# Patient Record
(demographics unavailable — no encounter records)

---

## 2024-10-21 NOTE — PHYSICAL EXAM
[Obese] : obese [Normal Conjunctiva] : normal conjunctiva [Normal Venous Pressure] : normal venous pressure [5th Left ICS - MCL] : palpated at the 5th LICS in the midclavicular line [Normal Rate] : normal [Rhythm Regular] : regular [Normal S1] : normal S1 [Normal S2] : normal S2 [II] : a grade 2 [___ +] : bilateral [unfilled]U+ pitting edema to the ankles [Left Carotid Bruit] : left carotid bruit heard [2+] : left 2+ [1+] : left 1+ [Bruit] : a bruit was heard [Normal] : clear lung fields, good air entry, no respiratory distress [Soft] : abdomen soft [Non Tender] : non-tender [Normal Bowel Sounds] : normal bowel sounds [Normal Gait] : normal gait [No Edema] : no edema [No Cyanosis] : no cyanosis [Edema ___] : edema [unfilled] [Memory Deficit] : memory deficit [Alert and Oriented] : alert and oriented [Normal memory] : normal memory [Rt] : no varicose veins of the right leg [Lt] : no varicose veins of the left leg [de-identified] : ulcer right leg

## 2024-10-21 NOTE — DISCUSSION/SUMMARY
[EKG obtained to assist in diagnosis and management of assessed problem(s)] : EKG obtained to assist in diagnosis and management of assessed problem(s) [FreeTextEntry1] : 51 year old  male with morbid obesity , who developed acute on chronic renal failure , , Patient denies any chest pain  with diastolic heart failure ? with hyperechoic myocardium , patient had PYP scan without evidence of amyloidosis , and biopsy of fat did not reveal amyloidosis , clinically compensated   Elevated blood pressure :   patient recieving Epogen ,  advised the patient to follow low salt diet , metoprolol 25 mg po daily consider increasing the dose   ? NSVT on monitor prior to dialysis , anemia  severe : no recurrence , normal EF , will  continue toprol xl 25 mg po daily , will need nuclear  stress test   2 day protocol    Cardiac murmur : Mild AS , mild MR ,  will continue to monitor with periodic echocardiogram   Morbid obesity  : encourage patient to follow life style modifications    ESRD on hemodialysis   follow up after 3 months

## 2024-10-21 NOTE — REVIEW OF SYSTEMS
[Lower Ext Edema] : lower extremity edema [Skin Lesions] : skin lesion(s): [Chills] : no chills [Blurry Vision] : no blurred vision [Earache] : no earache [Leg Claudication] : no intermittent leg claudication [Palpitations] : no palpitations [Wheezing] : no wheezing [Nausea] : no nausea [Change in Appetite] : no change in appetite [Urinary Frequency] : no change in urinary frequency [Joint Pain] : no joint pain [Rash] : no rash [Limb Weakness (Paresis)] : no limb weakness (Paresis) [Confusion] : no confusion was observed [Easy Bleeding] : no tendency for easy bleeding

## 2024-10-21 NOTE — REASON FOR VISIT
[Symptom and Test Evaluation] : symptom and test evaluation [Arrhythmia/ECG Abnorrmalities] : arrhythmia/ECG abnormalities [Hyperlipidemia] : hyperlipidemia [Hypertension] : hypertension [Spouse] : spouse

## 2024-10-21 NOTE — HISTORY OF PRESENT ILLNESS
[FreeTextEntry1] : 50 year old male with  who hospitalized april 7 th with renal failure , hyperkalemia , with Lower extremity edema with significant weight increased  70 pounds ,despite being on diet that made him loose weight before , patient was  noted to be renal failure , did have abnormal echo  showing possible cardiac amyloidosis , under went  renal biopsy, subsequent Bleed s/p PRBC transfusion , started on dialysis , patient being evaluated for possible amyloidosis , patient had negative PYP test , negative biopsy for amyloidosis ,  did have brief NSVT on monitor , echo showed normal EF , mild AS  mild MR TR   Patient was recommended to cardiac mri  , Patient was taken off of losartan due to hypotension during dialysis , bp is elevated today as he is scheduled to have dialysis this evening ,   Patient did have LE edema  with ulcer on right calf  which is healing slowly , underwound care , patient was given kflex , swelling getting better , patient says he is tired , not able to sleep well ,  breathing is better , denies any chest pain , his blood pressure is    hospital records reviewed

## 2024-10-21 NOTE — CARDIOLOGY SUMMARY
[de-identified] : 10/21/24 sinus rhythm non specific T changes  [de-identified] : 4/8/24  Mild LVH normal EF  Mild MR TR  mild AS

## 2025-01-13 NOTE — CARDIOLOGY SUMMARY
[de-identified] : 1/13/25  sinus rhythm left axis non specific T changes  [de-identified] : 12/5/24 Normal chemical nuclear study  [de-identified] : 4/8/24  Mild LVH normal EF  Mild MR TR  mild AS

## 2025-01-13 NOTE — HISTORY OF PRESENT ILLNESS
[FreeTextEntry1] : 51 year old male with  who hospitalized april 7 th with renal failure , hyperkalemia , with Lower extremity edema with significant weight increased  70 pounds ,despite being on diet that made him loose weight before , patient was  noted to be renal failure , did have abnormal echo  showing possible cardiac amyloidosis , under went  renal biopsy, subsequent Bleed s/p PRBC transfusion , started on dialysis , patient being evaluated for possible amyloidosis , patient had negative PYP test , negative biopsy for amyloidosis ,  did have brief NSVT on monitor , echo showed normal EF , mild AS  mild MR TR ,Patient was recommended to  had cardiac MRI ,   patient is scheduled to have colonoscopy   Patient was taken off of losartan due to hypotension during dialysis , bp is elevated today as he is scheduled to have dialysis this evening ,   Patient did have LE edema  with ulcer on right calf  which is healing slowly , underwound care , patient was given kflex , swelling getting better , patient says he is tired , not able to sleep well ,  breathing is better , denies any chest pain , his blood pressure is    hospital records reviewed

## 2025-01-13 NOTE — CARDIOLOGY SUMMARY
[de-identified] : 1/13/25  sinus rhythm left axis non specific T changes  [de-identified] : 12/5/24 Normal chemical nuclear study  [de-identified] : 4/8/24  Mild LVH normal EF  Mild MR TR  mild AS

## 2025-01-13 NOTE — PHYSICAL EXAM
[Rt] : no varicose veins of the right leg [Lt] : no varicose veins of the left leg [de-identified] : ulcer right leg

## 2025-01-13 NOTE — DISCUSSION/SUMMARY
[EKG obtained to assist in diagnosis and management of assessed problem(s)] : EKG obtained to assist in diagnosis and management of assessed problem(s) [FreeTextEntry1] : 51 year old  male with morbid obesity , who developed acute on chronic renal failure , , Patient denies any chest pain  with diastolic heart failure ? with hyperechoic myocardium , patient had PYP scan without evidence of amyloidosis, and biopsy of fat did not reveal amyloidosis , clinically compensated   Elevated blood pressure :   patient receiving Epogen( mircera ) scheduled to have dialysis this evening   advised the patient to follow low salt diet , increase  metoprolol  to 50 mg po daily   ? NSVT on monitor prior to dialysis , anemia  severe : no recurrence , normal EF ,  had normal chemical nuclear stress test , will  increase toprol xl 50  mg po daily ,    Cardiac murmur : Mild AS , mild MR ,  will continue to monitor with periodic echocardiogram   Morbid obesity  : encourage patient to follow life style modifications    ESRD on hemodialysis   patient is overall optimal and stable for low risk procedure    follow up after 3 months

## 2025-01-13 NOTE — PHYSICAL EXAM
[Rt] : no varicose veins of the right leg [Lt] : no varicose veins of the left leg [de-identified] : ulcer right leg

## 2025-01-13 NOTE — REVIEW OF SYSTEMS
[Chills] : no chills [Blurry Vision] : no blurred vision [Earache] : no earache [Leg Claudication] : no intermittent leg claudication [Palpitations] : no palpitations [Wheezing] : no wheezing [Nausea] : no nausea [Change in Appetite] : no change in appetite [Urinary Frequency] : no change in urinary frequency [Joint Pain] : no joint pain [Rash] : no rash [Limb Weakness (Paresis)] : no limb weakness (Paresis) [Confusion] : no confusion was observed [Easy Bleeding] : no tendency for easy bleeding

## 2025-04-09 NOTE — DISCUSSION/SUMMARY
[FreeTextEntry1] : 51 year old  male with morbid obesity , who developed acute on chronic renal failure , , Patient denies any chest pain  with diastolic heart failure ? with hyperechoic myocardium , patient had PYP scan without evidence of amyloidosis, and biopsy of fat did not reveal amyloidosis , clinically compensated   Elevated blood pressure :  elevated , did not take medication ,  patient receiving Epogen( mircera ) scheduled to have dialysis this evening   advised the patient to follow low salt diet , increase  metoprolol  to 50 mg po daily , take addition 50 mg today   ? NSVT on monitor prior to dialysis , anemia  severe : no recurrence , normal EF ,  had normal chemical nuclear stress test , will continue l 50  mg po daily ,    Cardiac murmur : Mild AS , mild MR ,  will continue to monitor with periodic echocardiogram   Morbid obesity  : encourage patient to follow life style modifications    ESRD on hemodialysis   patient is overall optimal and stable for low risk procedure    provided BP SBP,160   follow up after 3 months    [EKG obtained to assist in diagnosis and management of assessed problem(s)] : EKG obtained to assist in diagnosis and management of assessed problem(s)

## 2025-04-09 NOTE — CARDIOLOGY SUMMARY
[de-identified] : 4/9/25  sinus rhythm left axis non specific T changes  [de-identified] : 12/5/24 Normal chemical nuclear study  [de-identified] : 4/8/24  Mild LVH normal EF  Mild MR TR  mild AS

## 2025-04-09 NOTE — PHYSICAL EXAM
[Obese] : obese [Normal Conjunctiva] : normal conjunctiva [Normal Venous Pressure] : normal venous pressure [5th Left ICS - MCL] : palpated at the 5th LICS in the midclavicular line [Normal Rate] : normal [Rhythm Regular] : regular [Normal S1] : normal S1 [Normal S2] : normal S2 [II] : a grade 2 [___ +] : bilateral [unfilled]U+ pitting edema to the ankles [Left Carotid Bruit] : left carotid bruit heard [2+] : left 2+ [1+] : left 1+ [Bruit] : a bruit was heard [Normal] : clear lung fields, good air entry, no respiratory distress [Soft] : abdomen soft [Non Tender] : non-tender [Normal Bowel Sounds] : normal bowel sounds [Normal Gait] : normal gait [No Edema] : no edema [No Cyanosis] : no cyanosis [Edema ___] : edema [unfilled] [Memory Deficit] : memory deficit [Alert and Oriented] : alert and oriented [Normal memory] : normal memory [Rt] : no varicose veins of the right leg [Lt] : no varicose veins of the left leg [de-identified] : ulcer right leg

## 2025-04-09 NOTE — CARDIOLOGY SUMMARY
[de-identified] : 4/9/25  sinus rhythm left axis non specific T changes  [de-identified] : 12/5/24 Normal chemical nuclear study  [de-identified] : 4/8/24  Mild LVH normal EF  Mild MR TR  mild AS

## 2025-04-09 NOTE — PHYSICAL EXAM
[Obese] : obese [Normal Conjunctiva] : normal conjunctiva [Normal Venous Pressure] : normal venous pressure [5th Left ICS - MCL] : palpated at the 5th LICS in the midclavicular line [Normal Rate] : normal [Rhythm Regular] : regular [Normal S1] : normal S1 [Normal S2] : normal S2 [II] : a grade 2 [___ +] : bilateral [unfilled]U+ pitting edema to the ankles [Left Carotid Bruit] : left carotid bruit heard [2+] : left 2+ [1+] : left 1+ [Bruit] : a bruit was heard [Normal] : clear lung fields, good air entry, no respiratory distress [Soft] : abdomen soft [Non Tender] : non-tender [Normal Bowel Sounds] : normal bowel sounds [Normal Gait] : normal gait [No Edema] : no edema [No Cyanosis] : no cyanosis [Edema ___] : edema [unfilled] [Memory Deficit] : memory deficit [Alert and Oriented] : alert and oriented [Normal memory] : normal memory [Rt] : no varicose veins of the right leg [Lt] : no varicose veins of the left leg [de-identified] : ulcer right leg

## 2025-04-09 NOTE — HISTORY OF PRESENT ILLNESS
[FreeTextEntry1] : 51 year old male with  who hospitalized april 7 th with renal failure , hyperkalemia , with Lower extremity edema with significant weight increased  70 pounds ,despite being on diet that made him loose weight before , patient was  noted to be renal failure , did have abnormal echo  showing possible cardiac amyloidosis , under went  renal biopsy, subsequent Bleed s/p PRBC transfusion , on dialysis , patient being evaluated for possible amyloidosis , patient had negative PYP test , negative biopsy for amyloidosis ,  did have brief NSVT on monitor , echo showed normal EF , mild AS  mild MR TR ,   patient is scheduled to have colonoscopy  scheduled for tomorrow , at [facility] , patient denies any chest pain or shortness of breath or palpitation , does exercise regularly ,  exercise 15 miles per hours ,   Patient was taken off of losartan due to hypotension during dialysis , bp is elevated today as he did not take metoprolol   Patient did have LE edema  with ulcer on right calf  which is healed ,patient is active , feeling much better ,  able to sleep well    hospital records reviewed

## 2025-04-09 NOTE — HISTORY OF PRESENT ILLNESS
[FreeTextEntry1] : 51 year old male with  who hospitalized april 7 th with renal failure , hyperkalemia , with Lower extremity edema with significant weight increased  70 pounds ,despite being on diet that made him loose weight before , patient was  noted to be renal failure , did have abnormal echo  showing possible cardiac amyloidosis , under went  renal biopsy, subsequent Bleed s/p PRBC transfusion , on dialysis , patient being evaluated for possible amyloidosis , patient had negative PYP test , negative biopsy for amyloidosis ,  did have brief NSVT on monitor , echo showed normal EF , mild AS  mild MR TR ,   patient is scheduled to have colonoscopy  scheduled for tomorrow , at [facility] , patient denies any chest pain or shortness of breath or palpitation , does exercise regularly ,  exercise 15 miles per hours ,   Patient was taken off of losartan due to hypotension during dialysis , bp is elevated today as he did not take metoprolol   Patient did have LE edema  with ulcer on right calf  which is healed ,patient is active , feeling much better ,  able to sleep well    hospital records reviewed

## 2025-05-14 NOTE — HISTORY OF PRESENT ILLNESS
[de-identified] : This is a 50 year old male who is here for a follow up post recent hospitalization.  Here with his wife.  He was admitted from 4/7-4/24/24 for renal failure/hyperkalemia.  Worsening edema/anasarca, weeping LE wounds.  CT Abdomen and Pelvis:  Limited study due to excessive noise and noncontrast technique. Small bilateral pleural effusions, small volume ascites, and diffuse anasarca.  No hydronephrosis. Non obstructing right renal calculi. US Kidney and Bladder:  No hydronephrosis.  Hyperechoic kidneys bilaterally for which clinical correlation with intrinsic medical renal disease is recommended. The ureteral jets are not detected. Perihepatic ascites. US Duplex Venous Lower Ext Complete, Bilateral: No evidence of deep venous thrombosis in either lower extremity. Found to have nephrotic syndrome.    Underwent renal biopsy, started on HD.  Echo with EF 55-60%, concentric LVH noted. Bright appearance of the myocardium noted which may be present with cardiac amyloidosis. Renal biopsy thought to be related to underlying diabetic disease. Negative congo red.  Fat pad biopsy completed 4/23/24- negative for amyloid Cardiac NM PYP scan negative for amyloid.   Acute blood loss anemia 2/2 R perinephric subcapsular hematoma s/p renal biopsy  He has since improved post discharge.  CBC on 4/28- WBC 6.03 Hg 7.8 Plt 214 4/7/24- Hg 9.9/30.6  Receiving HD, overall has lost significant amount of weight.  Receiving mircera 60mcg on 5/13.     [de-identified] : He is here doing well.  Now receiving HD in [place] T/Th/Sat.  He continues to receive mircera at the HD center, uncertain dosing, ?200mcg.  Following with [facility] for potential kidney transplant.  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  Meditating, attempting to eat well.  On wegovy for weight loss.

## 2025-05-14 NOTE — REVIEW OF SYSTEMS
[Fatigue] : fatigue [Recent Change In Weight] : ~T recent weight change [Lower Ext Edema] : lower extremity edema [SOB on Exertion] : shortness of breath during exertion [Joint Pain] : joint pain [Negative] : Heme/Lymph [Fever] : no fever [Chills] : no chills [Night Sweats] : no night sweats [Chest Pain] : no chest pain [Palpitations] : no palpitations [Cough] : no cough [Abdominal Pain] : no abdominal pain [Vomiting] : no vomiting [Constipation] : no constipation

## 2025-05-14 NOTE — PHYSICAL EXAM
[Obese] : obese [Normal] : affect appropriate [de-identified] : + graft [de-identified] : b/l LE edema- trace

## 2025-05-14 NOTE — ASSESSMENT
[FreeTextEntry1] : This is a 51 year old male with a IgA lambda monoclonal gammopathy found to have ARF/nephrotic syndrome.   No evidence of underlying amyloidosis.  Suspect he likely has a IgA lambda MGUS- normal k/l FLC ratio.   5/23/24- IgA lambda/one weak lambda light chain, IgA 354, k/l FLC- 11.64/53.29, ratio 0.22  Renal biopsy was negative for [place] red, c/w diabetic changes.  Fat pad biopsy was also negative.  Cardiac PYP amyloid scan was negative.   Bone marrow biopsy 7/11/24- Normocellular marrow with trilineage hematopoiesis with maturation and mild plasmacytosis (5 to 9% of cells) Normal karyotype Normal FISH myeloma   Anemia due to CKD.  On Mircera 200mcg every 2 weeks.  Hg is 13.4 today.  Repeat his immunoelectrophoresis today.   Continued follow up with [facility] regarding renal transplant.  Follows with Dr. Pierce/Dr. Elliott.  Follow up in 6 months.